# Patient Record
(demographics unavailable — no encounter records)

---

## 2024-10-14 NOTE — HISTORY OF PRESENT ILLNESS
[FreeTextEntry1] :  Patient here for periodic assessment and blood work. [de-identified] :  Patient here for periodic assessment and blood work.

## 2024-10-23 NOTE — ASSESSMENT
[FreeTextEntry1] : 77-year-old woman, with a past medical history of hypertension and hyperlipidemia, presenting for establishment of cardiovascular care.   HTN: Blood pressure above goal. - increase losartan form 25 to 50 MG Oral Tablet; TAKE 1 TABLET DAILY AS DIRECTED - stop metoprolol Succinate ER 50 MG at this time to alleviate pill burden, and instead will escalate ARB dose (as above)  HLD: Latest lipid profile on October 14, 2024 within normal limits. - continue atorvastatin Calcium 20 MG Oral Tablet; TAKE 1 TABLET AT BEDTIME - continue ASA 81 mg PO QDay for CV risk reduction  Overweight: BMI 29.3 kg/m2. - lifestyle modifications (diet and exercise)  - weight loss counseling - increase aerobic exercise as tolerated to aim for approximately 30 minutes of activity 5 days a week - heart healthy diet low in sodium, sugars and saturated fats and high in fruits, vegetables and whole grains  F/u in 1 month for BP check.

## 2024-10-23 NOTE — PHYSICAL EXAM
[No Acute Distress] : no acute distress [Normal Venous Pressure] : normal venous pressure [No Carotid Bruit] : no carotid bruit [Normal S1, S2] : normal S1, S2 [No Murmur] : no murmur [No Rub] : no rub [No Gallop] : no gallop [Clear Lung Fields] : clear lung fields [Good Air Entry] : good air entry [No Respiratory Distress] : no respiratory distress  [Soft] : abdomen soft [Non Tender] : non-tender [No Masses/organomegaly] : no masses/organomegaly [Normal Bowel Sounds] : normal bowel sounds [No Edema] : no edema [No Cyanosis] : no cyanosis [No Clubbing] : no clubbing [No Varicosities] : no varicosities [Moves all extremities] : moves all extremities [No Focal Deficits] : no focal deficits [Normal Speech] : normal speech [Alert and Oriented] : alert and oriented [Normal memory] : normal memory

## 2024-10-23 NOTE — HISTORY OF PRESENT ILLNESS
[FreeTextEntry1] : 77-year-old woman, with a past medical history of hypertension and hyperlipidemia, presenting for establishment of cardiovascular care. Patient presently denies chest pain, shortness of breath/dyspnea on exertion, palpitations, dizziness/loss of consciousness, paroxysmal nocturnal dyspnea, orthopnea, headaches, abdominal pain, and lower extremity swelling. She only notes left lower back pain for approximately 5 months.

## 2024-11-25 NOTE — HISTORY OF PRESENT ILLNESS
[FreeTextEntry1] : 77-year-old woman, with a past medical history of hypertension and hyperlipidemia, presenting for follow up. Patient presently denies chest pain, shortness of breath/dyspnea on exertion, palpitations, dizziness/loss of consciousness, paroxysmal nocturnal dyspnea, orthopnea, headaches, abdominal pain, and lower extremity swelling. She only notes left lower back pain for approximately 5 months; and has started physical therapy earlier this month.

## 2024-11-25 NOTE — ASSESSMENT
[FreeTextEntry1] : 77-year-old woman, with a past medical history of hypertension and hyperlipidemia, presenting for follow up.   HTN: Blood pressure slightly above goal; but patient has home blood pressure readings of 110-120s/80s; therefore, I suspect there is an element of white coat hypertension at play.  - continue [id number] MG Oral Tablet; TAKE 1 TABLET DAILY AS DIRECTED - continue metoprolol Succinate ER 50 MG   HLD: Latest lipid profile on October 14, 2024 within normal limits. - continue atorvastatin Calcium 20 MG Oral Tablet; TAKE 1 TABLET AT BEDTIME - continue ASA 81 mg PO QDay for CV risk reduction  Overweight: BMI 29.3 kg/m2. - lifestyle modifications (diet and exercise) - weight loss counseling - increase aerobic exercise as tolerated to aim for approximately 30 minutes of activity 5 days a week - heart healthy diet low in sodium, sugars and saturated fats and high in fruits, vegetables and whole grains  F/u in 6 months for BP check.

## 2025-04-24 NOTE — HISTORY OF PRESENT ILLNESS
[FreeTextEntry8] : here c/o persistent left upper back pain x 4 months s/p PT w/o improvement s/p L/s spine xray revealed mild degenerative changes 
- - -

## 2025-05-06 NOTE — HISTORY OF PRESENT ILLNESS
[FreeTextEntry1] :  patient here for abnormal test results [de-identified] : Encounter to discuss test results

## 2025-05-28 NOTE — ASSESSMENT
[FreeTextEntry1] : 77-year-old woman, with a past medical history of hypertension and hyperlipidemia, presenting for follow up.   HTN: Blood pressure slightly above goal; but patient has home blood pressure readings of 110-120s/80s; therefore, I suspect there is an element of white coat hypertension at play.  - continue [id number] MG Oral Tablet; TAKE 1 TABLET DAILY AS DIRECTED - continue metoprolol Succinate ER 50 MG   HLD: Latest lipid profile on October 14, 2024 within normal limits. - continue atorvastatin Calcium 20 MG Oral Tablet; TAKE 1 TABLET AT BEDTIME - continue ASA 81 mg PO QDay for CV risk reduction  Overweight: BMI 29.3 kg/m2. - lifestyle modifications (diet and exercise) - weight loss counseling - increase aerobic exercise as tolerated to aim for approximately 30 minutes of activity 5 days a week - heart healthy diet low in sodium, sugars and saturated fats and high in fruits, vegetables and whole grains  F/u in 6 months for BP check.

## 2025-05-28 NOTE — HISTORY OF PRESENT ILLNESS
[FreeTextEntry1] : 78-year-old woman, with a past medical history of hypertension and hyperlipidemia, presenting for follow up. Patient presently denies chest pain, shortness of breath/dyspnea on exertion, palpitations, dizziness/loss of consciousness, paroxysmal nocturnal dyspnea, orthopnea, headaches, abdominal pain, and lower extremity swelling. She only notes left lower back pain for approximately 5 months; and has started physical therapy earlier this month.